# Patient Record
Sex: FEMALE | Race: BLACK OR AFRICAN AMERICAN | NOT HISPANIC OR LATINO | URBAN - METROPOLITAN AREA
[De-identification: names, ages, dates, MRNs, and addresses within clinical notes are randomized per-mention and may not be internally consistent; named-entity substitution may affect disease eponyms.]

---

## 2017-01-26 ENCOUNTER — HISTORICAL (OUTPATIENT)
Dept: ADMINISTRATIVE | Facility: HOSPITAL | Age: 22
End: 2017-01-26

## 2018-12-26 ENCOUNTER — HISTORICAL (OUTPATIENT)
Dept: RADIOLOGY | Facility: HOSPITAL | Age: 23
End: 2018-12-26

## 2019-05-31 ENCOUNTER — HISTORICAL (OUTPATIENT)
Dept: SURGERY | Facility: HOSPITAL | Age: 24
End: 2019-05-31

## 2019-06-11 ENCOUNTER — HISTORICAL (OUTPATIENT)
Dept: ADMINISTRATIVE | Facility: HOSPITAL | Age: 24
End: 2019-06-11

## 2020-05-19 ENCOUNTER — HISTORICAL (OUTPATIENT)
Dept: ADMINISTRATIVE | Facility: HOSPITAL | Age: 25
End: 2020-05-19

## 2020-12-16 ENCOUNTER — HISTORICAL (OUTPATIENT)
Dept: RADIATION THERAPY | Facility: HOSPITAL | Age: 25
End: 2020-12-16

## 2021-01-13 ENCOUNTER — HISTORICAL (OUTPATIENT)
Dept: RADIATION THERAPY | Facility: HOSPITAL | Age: 26
End: 2021-01-13

## 2021-03-09 ENCOUNTER — HISTORICAL (OUTPATIENT)
Dept: ADMINISTRATIVE | Facility: HOSPITAL | Age: 26
End: 2021-03-09

## 2021-03-09 ENCOUNTER — HISTORICAL (OUTPATIENT)
Dept: RADIATION THERAPY | Facility: HOSPITAL | Age: 26
End: 2021-03-09

## 2021-03-10 ENCOUNTER — HISTORICAL (OUTPATIENT)
Dept: RADIATION THERAPY | Facility: HOSPITAL | Age: 26
End: 2021-03-10

## 2021-03-11 ENCOUNTER — HISTORICAL (OUTPATIENT)
Dept: RADIATION THERAPY | Facility: HOSPITAL | Age: 26
End: 2021-03-11

## 2021-03-15 ENCOUNTER — HISTORICAL (OUTPATIENT)
Dept: RADIATION THERAPY | Facility: HOSPITAL | Age: 26
End: 2021-03-15

## 2022-04-07 ENCOUNTER — HISTORICAL (OUTPATIENT)
Dept: ADMINISTRATIVE | Facility: HOSPITAL | Age: 27
End: 2022-04-07

## 2022-04-23 VITALS
BODY MASS INDEX: 30.53 KG/M2 | HEIGHT: 66 IN | SYSTOLIC BLOOD PRESSURE: 126 MMHG | WEIGHT: 190 LBS | DIASTOLIC BLOOD PRESSURE: 74 MMHG

## 2022-04-30 NOTE — OP NOTE
DATE OF SURGERY:    03/09/2021    SURGEON:  Thony Alvarenga MD    ANESTHESIA:  LMA.    PROCEDURES:    1. Excision right inferior and left ear keloids measuring a combined 3 cm in length.  2. Injection of Kenalog into right and left ears preclosure.  3. Closure, bilateral ear sites.    INDICATION FOR PROCEDURE:  This is a 25-year-old female who presented to me with bilateral ear keloids after ear piercings in the distant past.  They have become quite large and irritating for her as well as she does not like the appearance of them.  Options were discussed and she elected to proceed with excision and injection of Kenalog and postoperative radiation therapy.  She understood that there is always the risk of recurrence and there will be a scar in the area of removal.  Informed consent was obtained.    DESCRIPTION OF PROCEDURE:  The patient was identified in preoperative holding.  Informed consent was reviewed with her.  She subsequently was taken to the operating room and placed in supine position, general endotracheal anesthesia provided.  He was prepped and draped in sterile surgical fashion.  Timeout was taken, everyone was in agreement.  We initially started the procedure of the patient's right ear, identified the keloid, and excised it at its base using a 15 blade scalpel.  Hemostasis was ensured with electrocautery.  We subsequently passed off the keloid tissue in his right ear specimen and subsequently injected a mixture of Kenalog 10 and 1% lidocaine with epinephrine into the patient's operative site.  After this was accomplished, I then subsequently closed the area with 5-0 Prolene in an interrupted fashion and used bacitracin ointment as a dressing.  The patient tolerated this well.  Attention was then paid to the patient's left ear.  Similarly, the 15 blade scalpel was used around the base to excise the keloid in its entirety.  Hemostasis was assured with electrocautery.  An admixture of Kenalog 10 and 1%  lidocaine with epinephrine was injected into the area.  I then subsequently closed the wound with 5-0 Prolene in interrupted fashion.  Bacitracin ointment was used as a dressing.  No complications.  All counts were correct.      ______________________________  MD ERIK York/LYNNE  DD:  03/09/2021  Time:  12:18PM  DT:  03/09/2021  Time:  12:44PM  Job #:  [id number]

## 2022-04-30 NOTE — OP NOTE
Patient:   Idris Gasca            MRN: [id number]            FIN: [id number]               Age:   23 years     Sex:  Female     :  1995   Associated Diagnoses:   None   Author:   Dimitrios Yun MD      Operative Note   Operative Information   Date/ Time:  2019 09:41:00.     Procedures Performed:   1.  Left knee diagnostic arthroscopy  2.  Partial medial meniscectomy (middle and posterior horn, white zone fraying)  3.  Partial lateral meniscectomy (posterior horn, white zone fraying)  4.  Revision anterior cruciate ligament reconstruction with quadriceps tendon autograft bone plug (10 mm tibial and femoral tunnels).     Preoperative Diagnosis:   1.  Left knee anterior cruciate ligament reconstruction graft tear.     Postoperative Diagnosis:   1.  Left knee anterior cruciate ligament reconstruction graft tear  2.  Medial meniscus fraying  3.  Lateral meniscus fraying.     Surgeon: Dimitrios Yun MD.     Anesthesia:   General anesthesia with femoral nerve block.     Esimated blood loss: loss less than  50  cc.     Complications: None.     Notes:   DVT prophylaxis: Patient placed on aspirin daily for 2 weeks  Instrumentation: Arthrex tight rope for femoral fixation, Arthrex 11 mm fast thread bio composite screw for tibial tunnel fixation, Arthrex 4.75 swivel lock anchor for back-up tibial fixation.       Diagnostic knee arthroscopy    The patient was carefully place in a supine position. The operative lower extremity was placed in the padded leg pacheco. The non-operative lower extremity was placed in padded leg rest. The operative site was prepped and draped in the normal sterile fashion. A time out was performed to confirm correct operative extremity, allergies, and antibiotic infusion.   The knee joint was infused with 60cc of Marcaine used to insufflate. The supero-medial inflow portal is established. The torito-medial and torito-lateral portals are established. All portal sites  established with 11-blade.    Through the torito-lateral portal, we then sequentially visualized these areas of the knee for any pathology: medial comparment, postero-medial compartment, lateral compartment, postero-lateral compartment, intercondylar compartment, suprapatellar compartment, medial gutter, and lateral gutter.     The certified assistant provided critical assistance by holding instruments including the arthroscope to provide adequate visualization of the procedure, as well as assisting in wound closure.    At the end of the procedure the knee portal sites were closed with 3-0 Monocryl subcutaneously.  The patient tolerated the procedure well and taken to the recovery room in good condition.     Partial medial menisectomy    With the arthroscope in the torito-lateral portal, the medial meniscus was visualized. With the knee slightly flexed and appropriate valgus force applied, we could visualize the medial compartment and medial meniscus. It was found to have a tear.     The medial meniscus tear was probed. All loose, inverted or everted components of the tear were identified and mobilized to all access to them with cutting instruments. The torn mobile fragments of the meniscus were carefully excised. Care was taken to avoid damage to the articular surfaces of the medial and tibial condylar articular surfaces. The menisectomy was carries out leaving a residual meniscal rim that was nicely contoured, and stable on palpation with a probe. The above was accomplished using a bitter and shaver.     Partial lateral menisectomy    With the arthroscope in the torito-lateral portal, the lateral meniscus was visualized. With the knee slightly flexed and placed in figure-four position, we could visualize the lateral compartment and lateral meniscus. It was found to have a tear.     The lateral meniscus tear was probed. All loose, inverted or everted components of the tear were identified and mobilized to all  access to them with cutting instruments. The torn mobile fragments of the meniscus were carefully excised. Care was taken to avoid damage to the articular surfaces of the lateral and tibial condylar articular surfaces. The menisectomy was carries out leaving a residual meniscal rim that was nicely contoured, and stable on palpation with a probe. The above was accomplished using a bitter and shaver.     Revision ACL reconstruction with autograft quadricep tendon with bone plug     The certified assistant prepared the graft while notchplasty and tunnel preparation proceeded. The joint was explored and the torn anterior cruciate ligament was visualized. The shaver was used to remove the stump of the ligament and strip the residual scar and ligament off the medial aspect of the lateral femoral condyle as well as the intercondylar eminence of the tibia. The notchplasty was carried out using the ravi, removing enough bone bone to visualized the most posterior portion of the medial aspect of the lateral and superior femoral condyle and insuring that there was no roof or wall impingment.    Using the ACL tibial guide, a guide pin was placed through the proximal tibia at 55° angle to enter the joint in the mid-portion of the tibial attachment of the normal anterior cruciate ligament about 6-8mm anterior to the posterior cruciate ligament consistent with the mid-point of the anterior horn of the lateral meniscus. Once the wire was appropriately positioned a reamer was used to create the tibial tunnel.  I then placed the camera into the tibial tunnel and there was no divergence of the tunnels and good bone bed throughout the entire tunnel.    Using an accessory torito-medial portal, a guide pin was placed on the lateral wall at the point where the bifurcate ridge and the intercondylar ridge intersect to give us an anatomic position for the reconstruction. The guide wire was drilled to through the far cortex of the femur. A  reamer was then drilled over the guide pin for a distance of 30mm. Before we drilled the femoral tunnel we had the knee flexed to 120°.  I then visualized the femoral tunnel and there was no divergence with the previous tunnel.  There was also good bone bed throughout the entire tunnel.    Next I moved on to harvest the quadriceps tendon graft.  Incision was made superior to the superior pole of the patella.  Dissection was done to carefully expose the quadriceps tendon.  After adequate visualization of the quadriceps tendon, I then used a double loaded blade to incise into the quadriceps tendon at its most proximal point.  I then carried this down distally.  Once I reached the superior pole the patella I then marked out a 2 cm length of bone on the superior aspect of the patella.  I then used a saw and cut the bone plug in a trapezoidal fashion in order to prevent patella fracture.  I then used an osteotome to remove the bone plug. The quadriceps tendon was closed with #2 non-absorbable suture, skin closed with 2-0 absorbable suture and 4-0 nylon.     A Beath pin was used to pass the sutures on the graft through the femoral tunnel and then retrieved from the tibail tunnel. The graft was then pulled through the tibial tunnel and then the femoral tunnel.  The button was then flipped on the floor cortex and then the knee was cycled to make sure the button actually flipped.  Then the graft with the bone plug was pulled into the femoral socket.  It was pulled to the 20 mm maia. The knee was cycled through full range of motion for 10 cylces. Then the guide wire for the tibial tunnel was placed and seen on the arthroscope in the tunnel. While the knee was at 10° flexion and also had posterior pressure on the tibia, the interference screw was placed over guide wire. After solid fixation of the graft the knee was reexamined and had full range of motion with no notch impingement. We then did a Lachman´s exam and pivot shift  exam that were both negative. .

## 2022-05-01 NOTE — HISTORICAL [facility] CERNER
This is a historical note converted from Kristy. Formatting and pictures may have been removed.  Please reference Kristy for original formatting and attached multimedia. Chief Complaint  POST-OP LEFT KNEE ACL. ?SX: 5/31/19. ?PT. HAS ON KNEE BRACE TODAY AND AMBULATING WITH CRUTCHES. ?XRAY DONE TODAY AND DOING GOOD...SB  History of Present Illness  No major issues or complaints.  Physical Exam  Vitals & Measurements  BP:?117/63?  HT:?168?cm? WT:?80.37?kg? BMI:?28.48?  Incisions clean, dry, and intact. No signs of infection. Neurovascular intact distally. No calf tenderness.  Assessment/Plan  1.?S/P ACL reconstruction?Z98.890  ? I reviewed the arthroscopic videos with the patient and fully explained surgical procedure. ?Patient will continue with physical therapy.? She does have some opening of her quadriceps incision?of only 1 to 2 mm.? I Steri-Stripped this area and we will refrain from any knee flexion for 1 week.? She will focus on quad?sets.? She will?return to physical therapy in 1 week.  Ordered:  Post-Op follow-up visit [id number] PC, S/P ACL reconstruction, [facility], 06/11/19 18:05:00 CDT  ?  Orders:  acetaminophen-HYDROcodone, 1 tab(s), Oral, q6hr, PRN PRN for pain, # 40 tab(s), 0 Refill(s), Pharmacy: [facility] - [place], [place]   Problem List/Past Medical History  Ongoing  ACL graft tear  Throat pain  Historical  No qualifying data  Procedure/Surgical History  Arthroscopically aided anterior cruciate ligament repair/augmentation or reconstruction (05/31/2019)  Arthroscopy ACL Reconstruction (.) (05/31/2019)  Arthroscopy, knee, diagnostic, with or without synovial biopsy (separate procedure) (05/31/2019)  Arthroscopy, knee, surgical; with meniscectomy (medial AND lateral, including any meniscal shaving) including debridement/shaving of articular cartilage (chondroplasty), same or separate compartment(s), when performed (05/31/2019)  Excision of Left Knee Joint, Percutaneous Endoscopic Approach  (05/31/2019)  Excision of Left Knee Joint, Percutaneous Endoscopic Approach (05/31/2019)  Excision of Left Upper Leg Tendon, Open Approach (05/31/2019)  Inspection of Left Knee Joint, Percutaneous Endoscopic Approach (05/31/2019)  Replacement of Left Knee Bursa and Ligament with Autologous Tissue Substitute, Percutaneous Endoscopic Approach (05/31/2019)  ACL Surgery   Medications  albuterol CFC free 90 mcg/inh inhalation aerosol with adapter,? ?Still taking, not as prescribed: Last Dose Date/Time unknown  amphetamine-dextroamphetamine 30 mg oral tablet, 30 mg= 1 tab(s), Oral, Daily,? ?Not Taking, Completed Rx  aspirin 81 mg oral Delayed Release (EC) tablet, 81 mg= 1 tab(s), Oral, Daily,? ?Not taking  Claritin 24 Hour Allergy 10 mg oral tablet, 10 mg= 1 tab(s), Oral, Daily  fentaNYL, 30 mcg= 0.6 mL, IV, Pre Op  Norco 7.5 mg-325 mg oral tablet, 1 tab(s), Oral, q6hr, PRN  Norco 7.5 mg-325 mg oral tablet, 1 tab(s), Oral, q6hr,? ?Not taking  Phenergan 12.5 mg Tab, 12.5 mg= 1 tab(s), Oral, q6hr, PRN,? ?Not taking  pimecrolimus 1% topical cream, TOP, BID  Singulair 10 mg oral TABLET, 10 mg= 1 tab(s), Oral, Daily  Allergies  No Known Allergies  Social History  Alcohol - Denies Alcohol Use, 09/28/2015  Never, 01/26/2017  Substance Use  Never, 01/26/2017  Tobacco - Denies Tobacco Use, 09/28/2015  Never (less than 100 in lifetime), N/A, 06/11/2019  Health Maintenance  Health Maintenance  ???Pending?(in the next year)  ??? ??OverDue  ??? ? ? ?Alcohol Misuse Screening due??01/01/19??and every 1??year(s)  ??? ??Due?  ??? ? ? ?ADL Screening due??06/11/19??and every 1??year(s)  ??? ? ? ?Blood Pressure Screening due??06/11/19??and every?  ??? ? ? ?Cervical Cancer Screening due??06/11/19??and every?  ??? ? ? ?Diabetes Screening due??06/11/19??and every?  ??? ? ? ?Tetanus Vaccine due??06/11/19??and every 10??year(s)  ??? ??Due In Future?  ??? ? ? ?Obesity Screening not due until??01/01/20??and every 1??year(s)  ??? ? ? ?Depression  Screening not due until??05/01/20??and every 1??year(s)  ??? ? ? ?Body Mass Index Check not due until??05/30/20??and every 1??year(s)  ???Satisfied?(in the past 1 year)  ??? ??Satisfied?  ??? ? ? ?Blood Pressure Screening on??06/11/19.??Satisfied by Sabrina Martin E.  ??? ? ? ?Body Mass Index Check on??06/11/19.??Satisfied by Sabrina Martin E.  ??? ? ? ?Depression Screening on??05/02/19.??Satisfied by Sabrina Martin E.  ??? ? ? ?Influenza Vaccine on??12/27/18.??Satisfied by Sabrina Martin E.  ??? ? ? ?Obesity Screening on??06/11/19.??Satisfied by Sabrina Martin E.  ?  ?

## 2022-05-01 NOTE — HISTORICAL [facility] CERNER
This is a historical note converted from Kristy. Formatting and pictures may have been removed.  Please reference Kristy for original formatting and attached multimedia. Chief Complaint  4 month f/u left ACL reconstruction sx 5/31/19. Pt reports that on Sunday she turned to walk back into the house and she heard a crack and is having trouble ambulating. Pt is using crutches today.  History of Present Illness  This patient?approximately 35 weeks pregnant?and she comes in today?complaint of left knee pain that happened acutely. ?She states she was?walking in?slightly tired her knee and she felt?a crack in her knee and since that point time she is had?pain with ambulating?on that knee.? She is using crutches to ambulate currently.? States the pain is?mild to moderate.? She had a previous history of a revision ACL reconstruction?to the left knee.? The pain is over the?proximal medial tibia.  Review of Systems  GENERAL: No fevers, chills, unexpected weight loss or gain  MUSCULOSKELETAL: Joint pain, extremity pain  Physical Exam  Vitals & Measurements  T:?98.6? ?F (Oral)? HR:?82(Peripheral)? BP:?126/74?  HT:?168?cm? WT:?86.18?kg? BMI:?30.53? LMP:?09/15/2019 00:00 CDT?  General: Well-developed, well-nourished.  Neuro: Alert and oriented x 3.  Psych: Normal mood and affect.  CV: Palpable radial pulses.  Resp: Smooth and unlabored.  Skin: No evidence of focal lesions or trauma.  Hem/Imm/Lymph: No evidence of lymphangitis or adenopathy.  Gait: No trendelenburg gait.  DTR: 2+, no hypo or hyperreflexia.  Coordination and Balance: No tremors or abnormal station.?  Knee Exam:  No obvious deformity.? Tenderness to palpation of the proximal medial tibia.? Range of motion from 0-130 degrees. Negative patella grind and equal subluxation of knee cap medial and lateral < 1cm. Negative patella tendon tenderness. Negative Lachman and anterior drawer test. Negative posterior drawer test. Negative varus and valgus stress test. Negative  medial joint line tenderness. Negative lateral joint line tenderness. 5/5 strength and normal skin appearance. Sensibility normal.  Assessment/Plan  1.?Pregnancy?Z34.90  She has no signs of instability.? Her x-rays are negative for any type of fracture or?stress injury.? I would like for her to continue to use her crutches until her pain has subsided.? The pain is right over the pes?anserine bursa?and she is tender over that area.? She will?contact her?OB/GYN doctor and see if?she can have oral steroids or either a steroid injection.? Once she gets this information she will call us back and let us know and I will proceed with 1 or the other.  ?  2.?Pes anserine bursitis?M70.50  Ordered:  Office/Outpatient Visit Level 4 Established [id number] PC, Left knee pain  Pes anserine bursitis  Status post anterior cruciate ligament surgery, [facility], 05/19/20 12:12:00 CDT  ?  3.?Left knee pain?M25.562  Ordered:  Office/Outpatient Visit Level 4 Established [id number] PC, Left knee pain  Pes anserine bursitis  Status post anterior cruciate ligament surgery, [facility], 05/19/20 12:12:00 CDT  ?  4.?Status post anterior cruciate ligament surgery?Z98.890  Ordered:  Office/Outpatient Visit Level 4 Established [id number] PC, Left knee pain  Pes anserine bursitis  Status post anterior cruciate ligament surgery, [facility], 05/19/20 12:12:00 CDT  ?   Problem List/Past Medical History  Ongoing  ACL graft tear  Obesity  Status post anterior cruciate ligament surgery  Throat pain  Historical  No qualifying data  Procedure/Surgical History  Arthroscopically aided anterior cruciate ligament repair/augmentation or reconstruction (05/31/2019)  Arthroscopy ACL Reconstruction (.) (05/31/2019)  Arthroscopy, knee, diagnostic, with or without synovial biopsy (separate procedure) (05/31/2019)  Arthroscopy, knee, surgical; with meniscectomy (medial AND lateral, including any meniscal shaving) including debridement/shaving  of articular cartilage (chondroplasty), same or separate compartment(s), when performed (05/31/2019)  Excision of Left Knee Joint, Percutaneous Endoscopic Approach (05/31/2019)  Excision of Left Knee Joint, Percutaneous Endoscopic Approach (05/31/2019)  Excision of Left Upper Leg Tendon, Open Approach (05/31/2019)  Inspection of Left Knee Joint, Percutaneous Endoscopic Approach (05/31/2019)  Replacement of Left Knee Bursa and Ligament with Autologous Tissue Substitute, Percutaneous Endoscopic Approach (05/31/2019)  ACL Surgery   Medications  albuterol CFC free 90 mcg/inh inhalation aerosol with adapter,? ?Still taking, not as prescribed: Last Dose Date/Time unknown  amphetamine-dextroamphetamine 30 mg oral tablet, 30 mg= 1 tab(s), Oral, Daily,? ?Not Taking, Completed Rx: Last Dose Date/Time Unknown  aspirin 81 mg oral Delayed Release (EC) tablet, 81 mg= 1 tab(s), Oral, Daily,? ?Not taking  Claritin 24 Hour Allergy 10 mg oral tablet, 10 mg= 1 tab(s), Oral, Daily  fentaNYL, 30 mcg= 0.6 mL, IV, Pre Op  ferrous gluconate 324 mg (38 mg elemental iron) oral tablet, 324 mg= 1 tab(s), Oral, BID  fluticasone 50 mcg/inh nasal spray, 1 spray(s), Nasal, Daily  hydrocortisone topical 2.5% cream, 1 shahzad, TOP, TID  Norco 7.5 mg-325 mg oral tablet, 1 tab(s), Oral, q6hr, PRN,? ?Not taking: Last Dose Date/Time Unknown  Phenergan 12.5 mg Tab, 12.5 mg= 1 tab(s), Oral, q6hr, PRN,? ?Not taking  pimecrolimus 1% topical cream, TOP, BID,? ?Not taking  Prenatal Multivitamins with Folic Acid 1 mg oral tablet, 1 tab(s), Oral, Daily  Singulair 10 mg oral TABLET, 10 mg= 1 tab(s), Oral, Daily  Allergies  No Known Allergies  Social History  Abuse/Neglect  No, 05/19/2020  Alcohol - Denies Alcohol Use, 09/28/2015  Never, 01/26/2017  Substance Use  Never, 01/26/2017  Tobacco - Denies Tobacco Use, 09/28/2015  Never (less than 100 in lifetime), No, 05/19/2020  Health Maintenance  Health Maintenance  ???Pending?(in the next year)  ??? ??OverDue  ??? ? ?  ?Alcohol Misuse Screening due??01/01/20??and every 1??year(s)  ??? ??Due?  ??? ? ? ?Depression Screening due??05/01/20??and every 1??year(s)  ??? ? ? ?ADL Screening due??05/19/20??and every 1??year(s)  ??? ? ? ?Cervical Cancer Screening due??05/19/20??and every?  ??? ? ? ?Diabetes Screening due??05/19/20??and every?  ??? ? ? ?Tetanus Vaccine due??05/19/20??and every 10??year(s)  ??? ??Due In Future?  ??? ? ? ?Blood Pressure Screening not due until??12/16/20??and every 1??year(s)  ??? ? ? ?Body Mass Index Check not due until??12/16/20??and every 1??year(s)  ??? ? ? ?Obesity Screening not due until??01/01/21??and every 1??year(s)  ???Satisfied?(in the past 1 year)  ??? ??Satisfied?  ??? ? ? ?Alcohol Misuse Screening on??12/17/19.??Satisfied by Sabrina Martin  ??? ? ? ?Blood Pressure Screening on??05/19/20.??Satisfied by Ange Gallegos  ??? ? ? ?Body Mass Index Check on??05/19/20.??Satisfied by Ange Gallegos  ??? ? ? ?Obesity Screening on??05/19/20.??Satisfied by Ange Gallegos  ?

## 2022-09-09 DIAGNOSIS — M79.672 FOOT PAIN, LEFT: Primary | ICD-10-CM

## 2022-09-13 DIAGNOSIS — M79.672 LEFT FOOT PAIN: Primary | ICD-10-CM

## 2025-03-14 ENCOUNTER — HOSPITAL ENCOUNTER (EMERGENCY)
Facility: HOSPITAL | Age: 30
Discharge: HOME | End: 2025-03-15
Attending: STUDENT IN AN ORGANIZED HEALTH CARE EDUCATION/TRAINING PROGRAM
Payer: COMMERCIAL

## 2025-03-14 DIAGNOSIS — S00.81XA FACIAL ABRASION, INITIAL ENCOUNTER: ICD-10-CM

## 2025-03-14 DIAGNOSIS — M25.511 ACUTE PAIN OF RIGHT SHOULDER: ICD-10-CM

## 2025-03-14 DIAGNOSIS — S02.19XA CLOSED FRACTURE OF ORBITAL PLATE OF ETHMOID BONE, INITIAL ENCOUNTER: ICD-10-CM

## 2025-03-14 DIAGNOSIS — M79.652 LEFT THIGH PAIN: ICD-10-CM
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
## 2025-03-14 PROCEDURE — 99285 EMERGENCY DEPT VISIT HI MDM: CPT | Mod: 25

## 2025-03-14 PROCEDURE — 25000003 PHARM REV CODE 250: Performed by: NURSE PRACTITIONER

## 2025-03-14 RX ORDER — ACETAMINOPHEN 500 MG
1000 TABLET ORAL
Status: COMPLETED | OUTPATIENT
Start: 2025-03-14 | End: 2025-03-14

## 2025-03-14 RX ADMIN — ACETAMINOPHEN 1000 MG: 500 TABLET ORAL at 10:03

## 2025-03-14 NOTE — Clinical Note
"Idris Lilly" Nahomy was seen and treated in our emergency department on 3/14/2025.  She may return to work on 03/17/2025.       If you have any questions or concerns, please don't hesitate to call.      Karol DARLING    "

## 2025-03-14 NOTE — Clinical Note
"Idris Lilly" Nahomy was seen and treated in our emergency department on 3/14/2025.  She may return to school on 03/17/2025.      If you have any questions or concerns, please don't hesitate to call.      Karol DARLING"

## 2025-03-15 VITALS
BODY MASS INDEX: 27.32 KG/M2 | TEMPERATURE: 98 F | OXYGEN SATURATION: 100 % | SYSTOLIC BLOOD PRESSURE: 125 MMHG | DIASTOLIC BLOOD PRESSURE: 77 MMHG | WEIGHT: 170 LBS | RESPIRATION RATE: 18 BRPM | HEIGHT: 66 IN | HEART RATE: 90 BPM

## 2025-03-15 LAB — B-HCG UR QL: NEGATIVE

## 2025-03-15 PROCEDURE — 96372 THER/PROPH/DIAG INJ SC/IM: CPT | Performed by: STUDENT IN AN ORGANIZED HEALTH CARE EDUCATION/TRAINING PROGRAM

## 2025-03-15 PROCEDURE — 63600175 PHARM REV CODE 636 W HCPCS: Mod: JZ,TB | Performed by: STUDENT IN AN ORGANIZED HEALTH CARE EDUCATION/TRAINING PROGRAM

## 2025-03-15 PROCEDURE — 81025 URINE PREGNANCY TEST: CPT | Performed by: NURSE PRACTITIONER

## 2025-03-15 PROCEDURE — 25000003 PHARM REV CODE 250: Performed by: STUDENT IN AN ORGANIZED HEALTH CARE EDUCATION/TRAINING PROGRAM

## 2025-03-15 RX ORDER — METHOCARBAMOL 500 MG/1
500 TABLET, FILM COATED ORAL 3 TIMES DAILY
Qty: 15 TABLET | Refills: 0 | Status: SHIPPED | OUTPATIENT
Start: 2025-03-15 | End: 2025-03-20

## 2025-03-15 RX ORDER — KETOROLAC TROMETHAMINE 10 MG/1
10 TABLET, FILM COATED ORAL EVERY 6 HOURS
Qty: 16 TABLET | Refills: 0 | Status: SHIPPED | OUTPATIENT
Start: 2025-03-15 | End: 2025-03-19

## 2025-03-15 RX ORDER — KETOROLAC TROMETHAMINE 30 MG/ML
30 INJECTION, SOLUTION INTRAMUSCULAR; INTRAVENOUS
Status: COMPLETED | OUTPATIENT
Start: 2025-03-15 | End: 2025-03-15

## 2025-03-15 RX ORDER — PROPARACAINE HYDROCHLORIDE 5 MG/ML
1 SOLUTION/ DROPS OPHTHALMIC
Status: DISCONTINUED | OUTPATIENT
Start: 2025-03-15 | End: 2025-03-15

## 2025-03-15 RX ADMIN — KETOROLAC TROMETHAMINE 30 MG: 30 INJECTION, SOLUTION INTRAMUSCULAR; INTRAVENOUS at 02:03

## 2025-03-15 NOTE — DISCHARGE INSTRUCTIONS
Thanks for letting us take care of you today!  It is our goal to give you courteous care and to keep you comfortable and informed, if you have any questions before you leave I will be happy to try and answer them.    Here is some advice after your visit:    Your visit in the emergency department is NOT definitive care - please follow-up with your primary care doctor and/or specialist within 1-2 days. Please return to the emergency department if you develop worsening symptoms including: fever, chills, chest pain, shortness of breath, weakness, numbness, tingling, nausea, vomiting, inability to eat, drink, or take your medication. Please return if you have any worsening in your condition or if you have any other concerns.    If you had radiology exams like an XRAY or CT in the emergency Department the interpreation on them may be preliminary - there may be less time sensitive findings on the reports please obtain these reports within 24 hours from the hospital or by using your out on your mobile phone to access records.  Bring these to your primary care doctor and/or specialist for further review of incidental findings.    Please review any LAB WORK from your visit today with your primary care physician.    You have been prescribed methocarbamol/Robaxin.  This is a muscle relaxer.  This may make you drowsy.  Please do not take with other sedating medications including hydrocodone/acetaminophen (Norco) or with alcohol.  Do not take and drive.    You have been prescribed ketorolac/Toradol.  If you are taking his medication please do not take any additional NSAIDs including ibuprofen, naproxen, indomethacin.  Please stay hydrated when taking this medication.
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
